# Patient Record
Sex: FEMALE | Race: WHITE | HISPANIC OR LATINO | Employment: UNEMPLOYED | URBAN - METROPOLITAN AREA
[De-identification: names, ages, dates, MRNs, and addresses within clinical notes are randomized per-mention and may not be internally consistent; named-entity substitution may affect disease eponyms.]

---

## 2023-05-04 ENCOUNTER — OFFICE VISIT (OUTPATIENT)
Dept: FAMILY MEDICINE CLINIC | Facility: CLINIC | Age: 88
End: 2023-05-04

## 2023-05-04 VITALS
OXYGEN SATURATION: 96 % | TEMPERATURE: 97.9 F | SYSTOLIC BLOOD PRESSURE: 133 MMHG | HEART RATE: 66 BPM | RESPIRATION RATE: 18 BRPM | WEIGHT: 132 LBS | DIASTOLIC BLOOD PRESSURE: 71 MMHG

## 2023-05-04 DIAGNOSIS — Z13.6 SCREENING FOR CARDIOVASCULAR CONDITION: ICD-10-CM

## 2023-05-04 DIAGNOSIS — M54.50 ACUTE BILATERAL LOW BACK PAIN WITHOUT SCIATICA: ICD-10-CM

## 2023-05-04 DIAGNOSIS — I10 PRIMARY HYPERTENSION: Primary | ICD-10-CM

## 2023-05-04 DIAGNOSIS — H93.11 TINNITUS OF RIGHT EAR: ICD-10-CM

## 2023-05-04 DIAGNOSIS — K21.9 GERD WITHOUT ESOPHAGITIS: ICD-10-CM

## 2023-05-04 DIAGNOSIS — E07.9 THYROID DISEASE: ICD-10-CM

## 2023-05-04 RX ORDER — PANTOPRAZOLE SODIUM 40 MG/1
40 TABLET, DELAYED RELEASE ORAL DAILY
Qty: 30 TABLET | Refills: 0 | Status: SHIPPED | OUTPATIENT
Start: 2023-05-04 | End: 2023-06-03

## 2023-05-04 RX ORDER — LOSARTAN POTASSIUM 100 MG/1
100 TABLET ORAL DAILY
COMMUNITY

## 2023-05-04 NOTE — ASSESSMENT & PLAN NOTE
Daughter reports history of thyroid problem, unspecified  No red flag symptoms noted, will check TSH at this time

## 2023-05-04 NOTE — PROGRESS NOTES
Name: Olga Salvador      : 1935      MRN: [id number]  Encounter Provider: Jasmyn Lee MD  Encounter Date: 2023   Encounter department: [address] [place] [address]    Assessment & Plan     1  Primary hypertension  Assessment & Plan:  BP today 133/71, at goal  Controlled with losartan 100mg QD      2  Acute bilateral low back pain without sciatica  Assessment & Plan:  S/p fall out of bed  Able to bear weight with normal gait, no red flag symptoms  Tylenol as needed and OTC analgesics  Follow-up in 2 weeks      3  GERD without esophagitis  Assessment & Plan:  Burning epigastric pain, unclear if related to meals  No bloating, distention, changes in bowel habits or unintentional weight loss  Trial of PPI for now x 2 weeks  Follow-up in 2 weeks for reevaluation    Orders:  -     pantoprazole (PROTONIX) 40 mg tablet; Take 1 tablet (40 mg total) by mouth daily    4  Thyroid disease  Assessment & Plan:  Daughter reports history of thyroid problem, unspecified  No red flag symptoms noted, will check TSH at this time  Orders:  -     TSH, 3rd generation with Free T4 reflex; Future    5  Screening for cardiovascular condition  -     Lipid panel; Future  -     CBC and differential; Future  -     Comprehensive metabolic panel; Future  -     Hemoglobin A1C; Future    6  Tinnitus of right ear  Assessment & Plan:  Chronic, bothersome but not worsening  Patient reassured  Counseled regarding adequate hydration, nutrition and sleep  Follow-up in 2 weeks for further reevaluation        Depression Screening and Follow-up Plan: Patient was screened for depression during today's encounter  They screened negative with a PHQ-2 score of 2  Falls Plan of Care: balance, strength, and gait training instructions were provided  Subjective      Olga Salvador is a 21-year-old female with h/o HTN presenting today as a new patient   She just moved from [facility] recently and is accompanied "by her daughter  Patient is C/o \"sound in her head\" which started about 6 mo ago  No hearing loss during this time or dizziness or a sense of imbalance  She states she has to use Zopiclona 7 5mg QHS to go to bed each night because of this  She also started having constant, \"burning sensation\" abdominal pain radiating to the back x 6 month which she is unable to tell if it is related to meals  Before travel to the [address] [place] [address] several weeks ago, the patient fell and have had low back pain since then  She was in bed and trying to get up but then slipped and landed on her buttocks on the floor  She has been able to bear weight since then with a normal gait  She has used lidocaine patch and ointment which moderate relief of pain  Patient looks comfortable during evaluation, NAD  No other joint pains reported or rashes  No h/o stroke, MI, diabetes  Never smoker, no illicit drug or alcohol use  Her only routine meds include losartan and Zopiclona  Review of Systems   Constitutional: Negative for fatigue and fever  HENT: Negative for congestion  Respiratory: Negative for cough, shortness of breath and wheezing  Cardiovascular: Negative for chest pain, palpitations and leg swelling  Gastrointestinal: Positive for abdominal pain  Negative for diarrhea, nausea and vomiting  Genitourinary: Negative for dysuria and pelvic pain  Musculoskeletal: Positive for back pain  Skin: Negative for rash  Neurological: Negative for weakness and headaches  Psychiatric/Behavioral: Positive for sleep disturbance  The patient is not nervous/anxious  Current Outpatient Medications on File Prior to Visit   Medication Sig    losartan (COZAAR) 100 MG tablet Take 100 mg by mouth daily       Objective     /71   Pulse 66   Temp 97 9 °F (36 6 °C)   Resp 18   Wt 59 9 kg (132 lb)   SpO2 96%     Physical Exam  Constitutional:       General: She is not in acute distress  Appearance: She is obese   She is not " ill-appearing or diaphoretic  HENT:      Head: Normocephalic and atraumatic  Right Ear: External ear normal       Left Ear: External ear normal    Eyes:      Conjunctiva/sclera: Conjunctivae normal    Cardiovascular:      Rate and Rhythm: Normal rate and regular rhythm  Heart sounds: Normal heart sounds  Pulmonary:      Effort: Pulmonary effort is normal  No respiratory distress  Breath sounds: Normal breath sounds  Abdominal:      Palpations: Abdomen is soft  Tenderness: There is no abdominal tenderness  Musculoskeletal:         General: No swelling, tenderness, deformity or signs of injury  Normal range of motion  Right lower leg: No edema  Left lower leg: No edema  Skin:     General: Skin is warm  Findings: No rash  Neurological:      Mental Status: She is alert         Florentin Watson MD

## 2023-05-04 NOTE — ASSESSMENT & PLAN NOTE
Burning epigastric pain, unclear if related to meals  No bloating, distention, changes in bowel habits or unintentional weight loss    Trial of PPI for now x 2 weeks  Follow-up in 2 weeks for reevaluation

## 2023-05-04 NOTE — ASSESSMENT & PLAN NOTE
S/p fall out of bed  Able to bear weight with normal gait, no red flag symptoms  Tylenol as needed and OTC analgesics    Follow-up in 2 weeks

## 2023-05-04 NOTE — ASSESSMENT & PLAN NOTE
Chronic, bothersome but not worsening  Patient reassured  Counseled regarding adequate hydration, nutrition and sleep    Follow-up in 2 weeks for further reevaluation

## 2023-05-05 ENCOUNTER — APPOINTMENT (OUTPATIENT)
Dept: LAB | Facility: CLINIC | Age: 88
End: 2023-05-05

## 2023-05-05 DIAGNOSIS — Z13.6 SCREENING FOR CARDIOVASCULAR CONDITION: ICD-10-CM

## 2023-05-05 DIAGNOSIS — E07.9 THYROID DISEASE: ICD-10-CM

## 2023-05-05 LAB
ALBUMIN SERPL BCP-MCNC: 3.6 G/DL (ref 3.5–5)
ALP SERPL-CCNC: 116 U/L (ref 46–116)
ALT SERPL W P-5'-P-CCNC: 14 U/L (ref 12–78)
ANION GAP SERPL CALCULATED.3IONS-SCNC: 2 MMOL/L (ref 4–13)
AST SERPL W P-5'-P-CCNC: 16 U/L (ref 5–45)
BASOPHILS # BLD AUTO: 0.04 THOUSANDS/ÂΜL (ref 0–0.1)
BASOPHILS NFR BLD AUTO: 1 % (ref 0–1)
BILIRUB SERPL-MCNC: 0.56 MG/DL (ref 0.2–1)
BUN SERPL-MCNC: 33 MG/DL (ref 5–25)
CALCIUM SERPL-MCNC: 8.7 MG/DL (ref 8.3–10.1)
CHLORIDE SERPL-SCNC: 110 MMOL/L (ref 96–108)
CHOLEST SERPL-MCNC: 167 MG/DL
CO2 SERPL-SCNC: 27 MMOL/L (ref 21–32)
CREAT SERPL-MCNC: 1.18 MG/DL (ref 0.6–1.3)
EOSINOPHIL # BLD AUTO: 0.16 THOUSAND/ÂΜL (ref 0–0.61)
EOSINOPHIL NFR BLD AUTO: 3 % (ref 0–6)
ERYTHROCYTE [DISTWIDTH] IN BLOOD BY AUTOMATED COUNT: 11.2 % (ref 11.6–15.1)
GFR SERPL CREATININE-BSD FRML MDRD: 42 ML/MIN/1.73SQ M
GLUCOSE P FAST SERPL-MCNC: 88 MG/DL (ref 65–99)
HCT VFR BLD AUTO: 40.4 % (ref 34.8–46.1)
HDLC SERPL-MCNC: 55 MG/DL
HGB BLD-MCNC: 13.5 G/DL (ref 11.5–15.4)
IMM GRANULOCYTES # BLD AUTO: 0.02 THOUSAND/UL (ref 0–0.2)
IMM GRANULOCYTES NFR BLD AUTO: 0 % (ref 0–2)
LDLC SERPL CALC-MCNC: 88 MG/DL (ref 0–100)
LYMPHOCYTES # BLD AUTO: 1.83 THOUSANDS/ÂΜL (ref 0.6–4.47)
LYMPHOCYTES NFR BLD AUTO: 38 % (ref 14–44)
MCH RBC QN AUTO: 33.4 PG (ref 26.8–34.3)
MCHC RBC AUTO-ENTMCNC: 33.4 G/DL (ref 31.4–37.4)
MCV RBC AUTO: 100 FL (ref 82–98)
MONOCYTES # BLD AUTO: 0.35 THOUSAND/ÂΜL (ref 0.17–1.22)
MONOCYTES NFR BLD AUTO: 7 % (ref 4–12)
NEUTROPHILS # BLD AUTO: 2.41 THOUSANDS/ÂΜL (ref 1.85–7.62)
NEUTS SEG NFR BLD AUTO: 51 % (ref 43–75)
NONHDLC SERPL-MCNC: 112 MG/DL
NRBC BLD AUTO-RTO: 0 /100 WBCS
PLATELET # BLD AUTO: 233 THOUSANDS/UL (ref 149–390)
PMV BLD AUTO: 9.3 FL (ref 8.9–12.7)
POTASSIUM SERPL-SCNC: 4.7 MMOL/L (ref 3.5–5.3)
PROT SERPL-MCNC: 7.2 G/DL (ref 6.4–8.4)
RBC # BLD AUTO: 4.04 MILLION/UL (ref 3.81–5.12)
SODIUM SERPL-SCNC: 139 MMOL/L (ref 135–147)
TRIGL SERPL-MCNC: 121 MG/DL
TSH SERPL DL<=0.05 MIU/L-ACNC: 0.93 UIU/ML (ref 0.45–4.5)
WBC # BLD AUTO: 4.81 THOUSAND/UL (ref 4.31–10.16)

## 2023-05-06 LAB
EST. AVERAGE GLUCOSE BLD GHB EST-MCNC: 105 MG/DL
HBA1C MFR BLD: 5.3 %

## 2023-05-18 ENCOUNTER — OFFICE VISIT (OUTPATIENT)
Dept: FAMILY MEDICINE CLINIC | Facility: CLINIC | Age: 88
End: 2023-05-18

## 2023-05-18 VITALS
RESPIRATION RATE: 18 BRPM | TEMPERATURE: 98.7 F | HEART RATE: 64 BPM | WEIGHT: 135.8 LBS | SYSTOLIC BLOOD PRESSURE: 145 MMHG | DIASTOLIC BLOOD PRESSURE: 79 MMHG

## 2023-05-18 DIAGNOSIS — G47.09 OTHER INSOMNIA: ICD-10-CM

## 2023-05-18 DIAGNOSIS — E07.9 THYROID DISEASE: ICD-10-CM

## 2023-05-18 DIAGNOSIS — H93.11 TINNITUS OF RIGHT EAR: ICD-10-CM

## 2023-05-18 DIAGNOSIS — I10 PRIMARY HYPERTENSION: ICD-10-CM

## 2023-05-18 DIAGNOSIS — K21.9 GERD WITHOUT ESOPHAGITIS: Primary | ICD-10-CM

## 2023-05-18 PROBLEM — G47.00 INSOMNIA: Status: ACTIVE | Noted: 2023-05-18

## 2023-05-18 NOTE — PROGRESS NOTES
"Name: Gaby Schuster      : 1935      MRN: [id number]  Encounter Provider: Wen Gr MD  Encounter Date: 2023   Encounter department: [address] [place] [address]     1  GERD without esophagitis  Assessment & Plan:  Stable  Improved with omeprazole 20 mg daily  Continue for now      2  Thyroid disease  Assessment & Plan:  TSH check  WNL  Patient and family reassured, continue to monitor for now  3  Primary hypertension  Assessment & Plan:  BP today 145/79, acceptable  Continue losartan 100 mg daily  Plan to schedule Annual physical after next visit  4  Tinnitus of right ear  Assessment & Plan:  Stable, unclear etiology  No hearing loss, dizziness or balance problems  Bilateral ear exam unremarkable  Referral to audiology for comprehensive hearing test  Will d/c eszociplone at this time  F/u in 3 wks  Orders:  -     Ambulatory Referral to Audiology; Future    5  Other insomnia  Assessment & Plan:  Chronic, [facility] Eszociplone [facility]  Given vague sx of \"sound in head\" and \"burning sensation across back and midline\" not amenable to trial of PPI, will wean and d/c this at this time  Discusses with family who are in agreement  Sleep hygiene discussed  PHQ-2 negative  Can try melatonin  Will reevaluate in 3 wks  Subjective      Gaby Schuster is a 82-year-old female recently established new patient with h/o HTN presenting today for follow-up  Recent blood work reviewed  All WNL including TSH  Patient continues to c/o \"sound in her head\" which started about 6 mo ago  No hearing loss during this time or dizziness or a sense of imbalance  Also, she continues with vague complaint of \"burning sensation\" around her midline which is different from the reflux sensation and chronic for her  No known aggravating or relieving factor, unrelated to meals, exertion or position   The Low back pain which she complained about at last visit " has now resolved  She is otherwise at baseline  Review of Systems   Constitutional: Negative for activity change, appetite change, fatigue and fever  HENT: Negative for sore throat  Respiratory: Negative for cough, shortness of breath and wheezing  Cardiovascular: Negative for chest pain, palpitations and leg swelling  Gastrointestinal: Negative for abdominal pain, diarrhea, nausea and vomiting  Genitourinary: Negative for dysuria and pelvic pain  Musculoskeletal: Negative for back pain and gait problem  Skin: Negative for rash  Neurological: Negative for weakness and headaches  Current Outpatient Medications on File Prior to Visit   Medication Sig   • losartan (COZAAR) 100 MG tablet Take 100 mg by mouth daily   • [DISCONTINUED] pantoprazole (PROTONIX) 40 mg tablet Take 1 tablet (40 mg total) by mouth daily       Objective     /79 (BP Location: Left arm, Patient Position: Sitting, Cuff Size: Adult)   Pulse 64   Temp 98 7 °F (37 1 °C) (Temporal)   Resp 18   Wt 61 6 kg (135 lb 12 8 oz)     Physical Exam  Constitutional:       General: She is not in acute distress  Appearance: She is not ill-appearing or diaphoretic  HENT:      Head: Normocephalic and atraumatic  Right Ear: Tympanic membrane, ear canal and external ear normal       Left Ear: Tympanic membrane, ear canal and external ear normal       Nose: Nose normal       Mouth/Throat:      Mouth: Mucous membranes are moist       Pharynx: No oropharyngeal exudate or posterior oropharyngeal erythema  Comments: edentulous  Eyes:      Extraocular Movements: Extraocular movements intact  Conjunctiva/sclera: Conjunctivae normal       Pupils: Pupils are equal, round, and reactive to light  Cardiovascular:      Rate and Rhythm: Normal rate and regular rhythm  Heart sounds: Normal heart sounds  Pulmonary:      Effort: Pulmonary effort is normal  No respiratory distress        Breath sounds: Normal breath sounds  No wheezing  Abdominal:      General: Bowel sounds are normal  There is no distension  Palpations: Abdomen is soft  Tenderness: There is no abdominal tenderness  Musculoskeletal:         General: No swelling, tenderness, deformity or signs of injury  Normal range of motion  Right lower leg: No edema  Left lower leg: No edema  Skin:     General: Skin is warm  Findings: No rash  Neurological:      Mental Status: She is alert     Psychiatric:         Mood and Affect: Mood normal          Behavior: Behavior normal        Marianne Brown MD

## 2023-05-18 NOTE — ASSESSMENT & PLAN NOTE
"Chronic, [facility]  Given vague sx of \"sound in head\" and \"burning sensation across back and midline\" not amenable to trial of PPI, will wean and d/c this at this time  Discusses with family who are in agreement  Sleep hygiene discussed  PHQ-2 negative  Can try melatonin  Will reevaluate in 3 wks    "

## 2023-05-18 NOTE — ASSESSMENT & PLAN NOTE
BP today 145/79, acceptable  Continue losartan 100 mg daily  Plan to schedule Annual physical after next visit

## 2023-05-18 NOTE — ASSESSMENT & PLAN NOTE
Stable, unclear etiology  No hearing loss, dizziness or balance problems  Bilateral ear exam unremarkable  Referral to audiology for comprehensive hearing test  Will d/c eszociplone at this time  F/u in 3 wks

## 2023-06-08 ENCOUNTER — TELEPHONE (OUTPATIENT)
Dept: FAMILY MEDICINE CLINIC | Facility: CLINIC | Age: 88
End: 2023-06-08

## 2023-06-08 NOTE — LETTER
"    [address] [place] [address]  [address] [facility] [address]  Phone#  [phone]  Fax#  [phone]    June 8, 2023    Claude Martini  1935  [address] [place] [id number] [phone]    Dear Claude Martini,     You had 1st no-show appointments at [address] [place] [address]  After each no-show, the office will send a letter warning you that excessive no-shows may result in your dismissal as a patient from the [address] [place] [address]    An appointment is considered a \"no-show\" when a patient does not arrive for their scheduled appointment and has not called the practice at least two (2) hours before their scheduled appointment to either reschedule or cancel the appointment  A \"no-show\" can also occur when an appointment is canceled due to the patient arriving fifteen (15) minutes or more past the scheduled time of their appointment  In accordance with the [facility]'s \"No-Show and Patient Dismissal Policy\", a patient may be dismissed from any [facility], at the discretion of the Practice Administrator and Medical Leadership, after a patient accumulates three (3) consecutive no-show appointments  In the future, we request that you call the office at least two (2) hours before your scheduled appointment to reschedule or cancel the appointment and avoid another no-show  If you need assistance with keeping scheduled appointments for any reason, speak to a member of your care team  [facility] has dedicated personnel that can connect you with resources       In order to assure quality and comprehensive care in a timely manner for all patients, to meet the needs of our patient population, and assist in any possible appropriate scheduling and receipt of care, the [facility] practices encourage all patients to arrive fifteen (15) minutes prior to their scheduled " appointment      Respectfully,   Practice Administrator